# Patient Record
Sex: FEMALE | Race: WHITE | NOT HISPANIC OR LATINO | Employment: UNEMPLOYED | URBAN - METROPOLITAN AREA
[De-identification: names, ages, dates, MRNs, and addresses within clinical notes are randomized per-mention and may not be internally consistent; named-entity substitution may affect disease eponyms.]

---

## 2024-07-05 ENCOUNTER — OFFICE VISIT (OUTPATIENT)
Dept: URGENT CARE | Facility: CLINIC | Age: 24
End: 2024-07-05
Payer: COMMERCIAL

## 2024-07-05 VITALS
OXYGEN SATURATION: 99 % | DIASTOLIC BLOOD PRESSURE: 57 MMHG | WEIGHT: 144 LBS | HEIGHT: 65 IN | TEMPERATURE: 96.5 F | SYSTOLIC BLOOD PRESSURE: 118 MMHG | BODY MASS INDEX: 23.99 KG/M2 | RESPIRATION RATE: 18 BRPM | HEART RATE: 52 BPM

## 2024-07-05 DIAGNOSIS — G44.209 ACUTE NON INTRACTABLE TENSION-TYPE HEADACHE: Primary | ICD-10-CM

## 2024-07-05 PROCEDURE — 99213 OFFICE O/P EST LOW 20 MIN: CPT

## 2024-07-05 PROCEDURE — 96372 THER/PROPH/DIAG INJ SC/IM: CPT

## 2024-07-05 RX ORDER — KETOROLAC TROMETHAMINE 30 MG/ML
30 INJECTION, SOLUTION INTRAMUSCULAR; INTRAVENOUS ONCE
Status: COMPLETED | OUTPATIENT
Start: 2024-07-05 | End: 2024-07-05

## 2024-07-05 RX ADMIN — KETOROLAC TROMETHAMINE 30 MG: 30 INJECTION, SOLUTION INTRAMUSCULAR; INTRAVENOUS at 17:07

## 2024-07-05 NOTE — PROGRESS NOTES
[facility]        NAME: Liza Boykin is a 24 y.o. female  : 2000    MRN: [id number]  DATE: 2024  TIME: 6:57 PM    Assessment and Plan   Acute non intractable tension-type headache [G44.209]  1. Acute non intractable tension-type headache  ketorolac (TORADOL) injection 30 mg            Patient Instructions       Follow up with PCP in 3-5 days.  Proceed to  ER if symptoms worsen.    If tests are performed, our office will contact you with results only if changes need to made to the care plan discussed with you at the visit. [facility]    Chief Complaint     Chief Complaint   Patient presents with   • Headache     Headaches for the last 3 days.          History of Present Illness       [place] and has had a headache for x3 days, just hit 1200 miles recently. Has taking tylenol and ibuprofen with minimal relief. She has had migraines as a child but as an adult typically not as often and do not last more than 1 day. She has not had any nausea or vomiting. She has had some diarrhea, loose stools 1-2x/day. She has been eating and drinking- no vomiting. She has not noted any fevers. She has not had any back/neck pain. Headache is located as a band wrapping around the head in a crown nature. Extending from forehead back to occiput of head.  Last dose around 12:00 took two tylenol. She has only been taking something for her discomfort about twice daily. She reports she gets short term relief with OTC treatment.     No red flag symptoms noted/reported. Advised with strict return/follow-up precautions.         Review of Systems   Review of Systems   Constitutional:  Positive for fatigue. Negative for appetite change, chills and fever.   HENT:  Negative for congestion, postnasal drip, rhinorrhea, sinus pressure, sinus pain, sore throat and trouble swallowing.    Respiratory:  Negative for cough and shortness of breath.   "  Gastrointestinal:  Negative for abdominal pain, constipation, diarrhea, nausea and vomiting.        Loose stools     Skin:  Negative for color change and rash.   Neurological:  Positive for headaches. Negative for dizziness and light-headedness.         Current Medications       Current Outpatient Medications:   •  sertraline (ZOLOFT) 50 mg tablet, TAKE 1 TABLET BY MOUTH DAILY FOR MAJOR DEPRESSIVE DISORDER., Disp: , Rfl:   No current facility-administered medications for this visit.    Current Allergies     Allergies as of 07/05/2024   • (No Known Allergies)            The following portions of the patient's history were reviewed and updated as appropriate: allergies, current medications, past family history, past medical history, past social history, past surgical history and problem list.     History reviewed. No pertinent past medical history.    History reviewed. No pertinent surgical history.    History reviewed. No pertinent family history.      Medications have been verified.        Objective   /57   Pulse (!) 52   Temp (!) 96.5 °F (35.8 °C)   Resp 18   Ht 5' 5\" (1.651 m)   Wt 65.3 kg (144 lb)   LMP  (Within Months) Comment: Late May has IUD  SpO2 99%   BMI 23.96 kg/m²        Physical Exam     Physical Exam  Vitals and nursing note reviewed.   Constitutional:       General: She is awake. She is not in acute distress.     Appearance: Normal appearance. She is well-developed and normal weight. She is not ill-appearing.   HENT:      Head: Normocephalic and atraumatic.      Right Ear: Tympanic membrane, ear canal and external ear normal.      Left Ear: Tympanic membrane, ear canal and external ear normal.      Nose: Nose normal.      Mouth/Throat:      Lips: Pink.      Mouth: Mucous membranes are moist.      Pharynx: Oropharynx is clear.   Eyes:      Extraocular Movements: Extraocular movements intact.      Conjunctiva/sclera: Conjunctivae normal.      Pupils: Pupils are equal, round, and reactive " to light.   Cardiovascular:      Rate and Rhythm: Normal rate and regular rhythm.      Pulses: Normal pulses.      Heart sounds: Normal heart sounds.   Pulmonary:      Effort: Pulmonary effort is normal.      Breath sounds: Normal breath sounds.   Abdominal:      General: Abdomen is flat. Bowel sounds are normal.      Palpations: Abdomen is soft.   Musculoskeletal:         General: Normal range of motion.      Cervical back: Full passive range of motion without pain, normal range of motion and neck supple.   Skin:     General: Skin is warm and dry.      Capillary Refill: Capillary refill takes less than 2 seconds.      Findings: No rash.   Neurological:      General: No focal deficit present.      Mental Status: She is alert and oriented to person, place, and time. Mental status is at baseline.   Psychiatric:         Mood and Affect: Mood normal.         Behavior: Behavior normal. Behavior is cooperative.